# Patient Record
Sex: MALE | Race: BLACK OR AFRICAN AMERICAN | Employment: UNEMPLOYED | URBAN - METROPOLITAN AREA
[De-identification: names, ages, dates, MRNs, and addresses within clinical notes are randomized per-mention and may not be internally consistent; named-entity substitution may affect disease eponyms.]

---

## 2024-02-08 ENCOUNTER — HOSPITAL ENCOUNTER (OUTPATIENT)
Dept: PHYSICAL THERAPY | Age: 49
Setting detail: THERAPIES SERIES
Discharge: HOME | End: 2024-02-08

## 2024-02-08 ASSESSMENT — PAIN DESCRIPTION - DESCRIPTORS: DESCRIPTORS: ACHING;BURNING;TINGLING;NUMBNESS

## 2024-02-08 ASSESSMENT — PAIN DESCRIPTION - ORIENTATION: ORIENTATION: RIGHT;LEFT

## 2024-02-08 ASSESSMENT — PAIN SCALES - GENERAL: PAINLEVEL_OUTOF10: 8

## 2024-02-08 ASSESSMENT — PAIN DESCRIPTION - PAIN TYPE: TYPE: ACUTE PAIN

## 2024-02-08 ASSESSMENT — PAIN DESCRIPTION - LOCATION: LOCATION: NECK;BACK

## 2024-02-08 NOTE — PROGRESS NOTES
[facility]                Phone: [phone]   Fax: [phone]    Physical Therapy Daily Treatment Note  Date:  2024    Patient Name:  Melba Clark    :  1975  MRN: [id number]    Evaluating therapist:  JACQUE Martin               (24)  Restrictions/Precautions:    Diagnosis:  LBP  Treatment Diagnosis:    Insurance/Certification information:  [facility]  Referring Physician:    Plan of care signed (Y/N):    Visit# / total visits:    Pain level: 8/10   Time In:  Time Out:    Subjective:      Exercises:  Exercise/Equipment Resistance/Repetitions Other comments   UBE               corner st     upper trap st     thoracic st     chest st            shrugs     scap ret              pendulums                                                                Other Therapeutic Activities:      Home Exercise Program:  provided 24    Manual Treatments:      Modalities:  IFC/MH to neck/upper back     Timed Code Treatment Minutes:      Total Treatment Minutes:      Treatment/Activity Tolerance:  [] Patient tolerated treatment well [] Patient limited by fatique  [] Patient limited by pain  [] Patient limited by other medical complications  [] Other:     Prognosis: [] Good [] Fair  [] Poor    Patient Requires Follow-up: [] Yes  [] No    Plan:   [] Continue per plan of care [] Alter current plan (see comments)  [] Plan of care initiated [] Hold pending MD visit [] Discharge  Plan for Next Session:      See Weekly Progress Note: []  Yes  []  No  Next due:        Electronically signed by:  Olman Still, PT   
Right, Left  Pain Descriptors: Aching, Burning, Tingling, Numbness      OBJECTIVE EXAMINATION     Regional Screen:   UE Screen: AROM/strength grossly WNL for all ranges B UE's     Observations:   General Observations  Description: forward head/rounded shoulders/B protracted scapulae    Palpation:   Cervical Spine Palpation: discomfort noted across B cervical paraspinals C2-T4 into B upper traps/scap retrators                       Endurance:  FAIR+/GOOD- for all prolonged activities      Neuro Screen:   Sensation      Sensation  Overall Sensation Status: WNL         Cervical Assessment     AROM Cervical Spine   Cervical spine general AROM: grossly limited ~ 25% WNL for all ranges with no c/o radiculopathy noted           Special Tests:   Special Tests for Cervical Spine  Compression Test : neutral/flexed/extended (-/-/-)       ASSESSMENT     Impression: Assessment: Pain noted across B cervical/thoracic paraspinals with all prolonged activities, 8/10    Body Structures, Functions, Activity Limitations Requiring Skilled Therapeutic Intervention: Decreased ROM, Decreased endurance    Statement of Medical Necessity: Physical Therapy is both indicated and medically necessary as outlined in the POC to increase the likelihood of meeting the functionally related goals stated below.     Patient's Activity Tolerance: Patient tolerated evaluation without incident      Patient's rehabilitation potential/prognosis is considered to be: Fair    Factors which may impact rehabilitation potential include:          GOALS   Patient Goal(s):    Goals Completed by   Goal Status   Decrease pain across neck/upper back with all prolonged activities, 0-4/10     Restore cervical AROM to WNL for all ranges     Improve endurance for all prolonged activities to GOOD/GOOD+     Assure I with HEP for home management of condition                  TREATMENT PLAN       Pt. actively involved in establishing Plan of Care and Goals: Yes      Treatment

## 2024-03-14 ENCOUNTER — HOSPITAL ENCOUNTER (OUTPATIENT)
Dept: PHYSICAL THERAPY | Age: 49
Setting detail: THERAPIES SERIES
Discharge: HOME | End: 2024-03-14
Payer: COMMERCIAL

## 2024-03-14 PROCEDURE — G0283 ELEC STIM OTHER THAN WOUND: HCPCS | Performed by: PHYSICAL THERAPIST

## 2024-03-14 PROCEDURE — 97110 THERAPEUTIC EXERCISES: CPT | Performed by: PHYSICAL THERAPIST

## 2024-03-14 NOTE — PROGRESS NOTES
S:  pt presents to therapy for first visit back since eval; at this time he continues to c/o ; pain level given as 8/10 and remains constant in nature; tells PT he is noticing that he has a tendency to tilt his head for comfort; no c/o buckling or LOB over last week's time; HEP going well per pt    O:  performed the exercises/treatments as written in the flowsheet for the week ending 3/15/24; initiated HEP for home management of condition; cervical AROM grossly 75%WNL for all ranges; B UE strength grossly 5/5 for all ranges     A:  matt tx well; pt able to perform all requested tasks with good form and pacing noted; cervical AROM/B UE strength grossly stable since eval; endurance for all prolonged activities is GOOD-     P:  cont with POC of stretching/strengthening for neck/upper back with modalities as needed

## 2024-03-14 NOTE — PROGRESS NOTES
[facility]                Phone: [phone]   Fax: [phone]    Physical Therapy Daily Treatment Note  Date:  3/14/2024    Patient Name:  Melba Clark    :  1975  MRN: [id number]    Evaluating therapist:  JACQUE Martin               (24)  Restrictions/Precautions:    Diagnosis:  LBP  Treatment Diagnosis:    Insurance/Certification information:  [facility]  Referring Physician:    Plan of care signed (Y/N):    Visit# / total visits:    Pain level: 8/10   Time In:  1000  Time Out:  1045    Subjective:      Exercises:  Exercise/Equipment Resistance/Repetitions Other comments   UBE    3 min F/B            corner st 3x20s    upper trap st 3x20s    thoracic st 3x20s    chest st 3x20s           shrugs 15x3s    scap ret 15x3s             pendulums  20x3lb                                                              Other Therapeutic Activities:      Home Exercise Program:  provided 24    Manual Treatments:      Modalities:  IFC/MH to neck/upper back x 15 min     Timed Code Treatment Minutes:      Total Treatment Minutes:      Treatment/Activity Tolerance:  [] Patient tolerated treatment well [] Patient limited by fatique  [] Patient limited by pain  [] Patient limited by other medical complications  [] Other:     Prognosis: [] Good [] Fair  [] Poor    Patient Requires Follow-up: [] Yes  [] No    Plan:   [] Continue per plan of care [] Alter current plan (see comments)  [] Plan of care initiated [] Hold pending MD visit [] Discharge  Plan for Next Session:      See Weekly Progress Note: []  Yes  []  No  Next due:        Electronically signed by:  Olman Still PT

## 2024-04-29 ENCOUNTER — HOSPITAL ENCOUNTER (OUTPATIENT)
Dept: PHYSICAL THERAPY | Age: 49
Setting detail: THERAPIES SERIES
Discharge: HOME | End: 2024-04-29
Payer: COMMERCIAL

## 2024-04-29 PROCEDURE — G0283 ELEC STIM OTHER THAN WOUND: HCPCS | Performed by: PHYSICAL THERAPIST

## 2024-04-29 PROCEDURE — 97110 THERAPEUTIC EXERCISES: CPT | Performed by: PHYSICAL THERAPIST

## 2024-04-29 NOTE — PROGRESS NOTES
[facility]                Phone: [phone]   Fax: [phone]    Physical Therapy Daily Treatment Note  Date:  2024    Patient Name:  Melba Clark    :  1975  MRN: [id number]    Evaluating therapist:  JACQUE Martin               (24)  Restrictions/Precautions:    Diagnosis:  LBP  Treatment Diagnosis:    Insurance/Certification information:  [facility]  Referring Physician:    Plan of care signed (Y/N):    Visit# / total visits:  3/6  Pain level: 8/10   Time In:  955  Time Out:  1046    Subjective:      Exercises:  Exercise/Equipment Resistance/Repetitions Other comments   UBE    3 min F/B            corner st 3x20s    upper trap st 3x20s    thoracic st 3x20s    chest st 3x20s           shrugs 15x3s    scap ret 15x3s             pendulums  20x3lb                                                              Other Therapeutic Activities:      Home Exercise Program:  provided 24    Manual Treatments:      Modalities:  IFC/MH to neck/upper back x 15 min     Timed Code Treatment Minutes:      Total Treatment Minutes:      Treatment/Activity Tolerance:  [] Patient tolerated treatment well [] Patient limited by fatique  [] Patient limited by pain  [] Patient limited by other medical complications  [] Other:     Prognosis: [] Good [] Fair  [] Poor    Patient Requires Follow-up: [] Yes  [] No    Plan:   [] Continue per plan of care [] Alter current plan (see comments)  [] Plan of care initiated [] Hold pending MD visit [] Discharge  Plan for Next Session:      See Weekly Progress Note: []  Yes  []  No  Next due:        Electronically signed by:  Olman Still PT